# Patient Record
Sex: FEMALE | Race: BLACK OR AFRICAN AMERICAN | Employment: PART TIME | URBAN - METROPOLITAN AREA
[De-identification: names, ages, dates, MRNs, and addresses within clinical notes are randomized per-mention and may not be internally consistent; named-entity substitution may affect disease eponyms.]

---

## 2017-03-20 ENCOUNTER — TELEPHONE (OUTPATIENT)
Dept: OBGYN CLINIC | Age: 42
End: 2017-03-20

## 2017-04-11 ENCOUNTER — HOSPITAL ENCOUNTER (OUTPATIENT)
Dept: PAIN MANAGEMENT | Age: 42
Discharge: HOME | End: 2017-04-11
Payer: MEDICARE

## 2017-04-11 VITALS
SYSTOLIC BLOOD PRESSURE: 108 MMHG | OXYGEN SATURATION: 100 % | HEIGHT: 66 IN | WEIGHT: 152 LBS | DIASTOLIC BLOOD PRESSURE: 81 MMHG | RESPIRATION RATE: 18 BRPM | TEMPERATURE: 98.4 F | HEART RATE: 57 BPM | BODY MASS INDEX: 24.43 KG/M2

## 2017-04-11 DIAGNOSIS — M54.12 CERVICAL NEURITIS: Chronic | ICD-10-CM

## 2017-04-11 PROCEDURE — G0463 HOSPITAL OUTPT CLINIC VISIT: HCPCS

## 2017-04-11 RX ORDER — GABAPENTIN 100 MG/1
100 CAPSULE ORAL 3 TIMES DAILY
COMMUNITY
End: 2018-06-21

## 2017-04-11 ASSESSMENT — PAIN SCALES - GENERAL: PAINLEVEL_OUTOF10: 7

## 2017-04-11 ASSESSMENT — PAIN DESCRIPTION - PROGRESSION: CLINICAL_PROGRESSION: GRADUALLY WORSENING

## 2017-04-11 ASSESSMENT — PAIN DESCRIPTION - ORIENTATION: ORIENTATION: RIGHT;LEFT;LOWER;MID

## 2017-04-11 ASSESSMENT — PAIN DESCRIPTION - FREQUENCY: FREQUENCY: CONTINUOUS

## 2017-04-11 ASSESSMENT — PAIN DESCRIPTION - PAIN TYPE: TYPE: CHRONIC PAIN

## 2017-04-19 ENCOUNTER — HOSPITAL ENCOUNTER (OUTPATIENT)
Dept: PAIN MANAGEMENT | Age: 42
Discharge: HOME | End: 2017-04-19
Payer: MEDICARE

## 2017-04-19 VITALS
BODY MASS INDEX: 24.43 KG/M2 | WEIGHT: 152 LBS | TEMPERATURE: 98.1 F | HEIGHT: 66 IN | OXYGEN SATURATION: 99 % | RESPIRATION RATE: 18 BRPM | HEART RATE: 54 BPM | DIASTOLIC BLOOD PRESSURE: 81 MMHG | SYSTOLIC BLOOD PRESSURE: 131 MMHG

## 2017-04-19 DIAGNOSIS — M54.12 BRACHIAL NEURITIS OR RADICULITIS NOS: ICD-10-CM

## 2017-04-19 PROCEDURE — 62321 NJX INTERLAMINAR CRV/THRC: CPT

## 2017-04-19 PROCEDURE — 6360000002 HC RX W HCPCS: Performed by: ANESTHESIOLOGY

## 2017-04-19 PROCEDURE — 2580000003 HC RX 258: Performed by: ANESTHESIOLOGY

## 2017-04-19 RX ORDER — LIDOCAINE HYDROCHLORIDE 10 MG/ML
5 INJECTION, SOLUTION EPIDURAL; INFILTRATION; INTRACAUDAL; PERINEURAL ONCE
Status: DISCONTINUED | OUTPATIENT
Start: 2017-04-19 | End: 2017-04-20 | Stop reason: HOSPADM

## 2017-04-19 RX ORDER — SODIUM CHLORIDE, SODIUM LACTATE, POTASSIUM CHLORIDE, CALCIUM CHLORIDE 600; 310; 30; 20 MG/100ML; MG/100ML; MG/100ML; MG/100ML
500 INJECTION, SOLUTION INTRAVENOUS CONTINUOUS
Status: DISCONTINUED | OUTPATIENT
Start: 2017-04-19 | End: 2017-04-20 | Stop reason: HOSPADM

## 2017-04-19 RX ORDER — MIDAZOLAM HYDROCHLORIDE 1 MG/ML
0.5 INJECTION INTRAMUSCULAR; INTRAVENOUS
Status: DISCONTINUED | OUTPATIENT
Start: 2017-04-19 | End: 2017-04-20 | Stop reason: HOSPADM

## 2017-04-19 RX ORDER — FENTANYL CITRATE 50 UG/ML
25 INJECTION, SOLUTION INTRAMUSCULAR; INTRAVENOUS
Status: DISCONTINUED | OUTPATIENT
Start: 2017-04-19 | End: 2017-04-20 | Stop reason: HOSPADM

## 2017-04-19 RX ADMIN — SODIUM CHLORIDE, POTASSIUM CHLORIDE, SODIUM LACTATE AND CALCIUM CHLORIDE 500 ML: 600; 310; 30; 20 INJECTION, SOLUTION INTRAVENOUS at 10:55

## 2017-04-19 RX ADMIN — MIDAZOLAM HYDROCHLORIDE 0.5 MG: 1 INJECTION, SOLUTION INTRAMUSCULAR; INTRAVENOUS at 11:15

## 2017-04-19 RX ADMIN — FENTANYL CITRATE 25 MCG: 50 INJECTION, SOLUTION INTRAMUSCULAR; INTRAVENOUS at 11:15

## 2017-04-19 ASSESSMENT — PAIN SCALES - GENERAL
PAINLEVEL_OUTOF10: 5
PAINLEVEL_OUTOF10: 5

## 2017-05-03 ENCOUNTER — HOSPITAL ENCOUNTER (OUTPATIENT)
Dept: PAIN MANAGEMENT | Age: 42
Discharge: HOME | End: 2017-05-03
Payer: MEDICARE

## 2017-05-03 VITALS
DIASTOLIC BLOOD PRESSURE: 70 MMHG | RESPIRATION RATE: 20 BRPM | BODY MASS INDEX: 24.43 KG/M2 | OXYGEN SATURATION: 99 % | WEIGHT: 152 LBS | TEMPERATURE: 96.8 F | HEIGHT: 66 IN | HEART RATE: 53 BPM | SYSTOLIC BLOOD PRESSURE: 118 MMHG

## 2017-05-03 DIAGNOSIS — M54.9 BACKACHE, UNSPECIFIED: ICD-10-CM

## 2017-05-03 PROCEDURE — 2580000003 HC RX 258: Performed by: ANESTHESIOLOGY

## 2017-05-03 PROCEDURE — 6360000004 HC RX CONTRAST MEDICATION

## 2017-05-03 PROCEDURE — 62321 NJX INTERLAMINAR CRV/THRC: CPT

## 2017-05-03 PROCEDURE — 6360000002 HC RX W HCPCS

## 2017-05-03 RX ORDER — FENTANYL CITRATE 50 UG/ML
50 INJECTION, SOLUTION INTRAMUSCULAR; INTRAVENOUS PRN
Status: DISCONTINUED | OUTPATIENT
Start: 2017-05-03 | End: 2017-05-04 | Stop reason: HOSPADM

## 2017-05-03 RX ORDER — MIDAZOLAM HYDROCHLORIDE 1 MG/ML
0.5 INJECTION INTRAMUSCULAR; INTRAVENOUS 4 TIMES DAILY PRN
Status: DISCONTINUED | OUTPATIENT
Start: 2017-05-03 | End: 2017-05-04 | Stop reason: HOSPADM

## 2017-05-03 RX ORDER — LIDOCAINE HYDROCHLORIDE 10 MG/ML
1 INJECTION, SOLUTION EPIDURAL; INFILTRATION; INTRACAUDAL; PERINEURAL ONCE
Status: DISCONTINUED | OUTPATIENT
Start: 2017-05-03 | End: 2017-05-04 | Stop reason: HOSPADM

## 2017-05-03 RX ORDER — SODIUM CHLORIDE, SODIUM LACTATE, POTASSIUM CHLORIDE, CALCIUM CHLORIDE 600; 310; 30; 20 MG/100ML; MG/100ML; MG/100ML; MG/100ML
500 INJECTION, SOLUTION INTRAVENOUS CONTINUOUS
Status: DISCONTINUED | OUTPATIENT
Start: 2017-05-03 | End: 2017-05-04 | Stop reason: HOSPADM

## 2017-05-03 RX ADMIN — SODIUM CHLORIDE, POTASSIUM CHLORIDE, SODIUM LACTATE AND CALCIUM CHLORIDE 500 ML: 600; 310; 30; 20 INJECTION, SOLUTION INTRAVENOUS at 09:28

## 2017-05-03 ASSESSMENT — PAIN - FUNCTIONAL ASSESSMENT
PAIN_FUNCTIONAL_ASSESSMENT: 0-10

## 2017-05-03 ASSESSMENT — PAIN DESCRIPTION - DESCRIPTORS: DESCRIPTORS: TIGHTNESS;ACHING;BURNING

## 2017-05-23 ENCOUNTER — TELEPHONE (OUTPATIENT)
Dept: OBGYN CLINIC | Age: 42
End: 2017-05-23

## 2017-06-20 ENCOUNTER — TELEPHONE (OUTPATIENT)
Dept: OBGYN CLINIC | Age: 42
End: 2017-06-20

## 2017-11-02 ENCOUNTER — OFFICE VISIT (OUTPATIENT)
Dept: OBGYN CLINIC | Age: 42
End: 2017-11-02
Payer: MEDICARE

## 2017-11-02 VITALS
DIASTOLIC BLOOD PRESSURE: 80 MMHG | SYSTOLIC BLOOD PRESSURE: 120 MMHG | HEIGHT: 66 IN | BODY MASS INDEX: 26.52 KG/M2 | WEIGHT: 165 LBS

## 2017-11-02 DIAGNOSIS — Z12.39 SCREENING FOR MALIGNANT NEOPLASM OF BREAST: ICD-10-CM

## 2017-11-02 DIAGNOSIS — N89.8 VAGINAL DISCHARGE: ICD-10-CM

## 2017-11-02 DIAGNOSIS — Z12.4 SCREENING FOR MALIGNANT NEOPLASM OF CERVIX: ICD-10-CM

## 2017-11-02 DIAGNOSIS — Z01.419 ENCOUNTER FOR ANNUAL ROUTINE GYNECOLOGICAL EXAMINATION: Primary | ICD-10-CM

## 2017-11-02 PROCEDURE — 99396 PREV VISIT EST AGE 40-64: CPT | Performed by: OBSTETRICS & GYNECOLOGY

## 2017-11-02 RX ORDER — SODIUM CHLORIDE 0.65 %
AEROSOL, SPRAY (ML) NASAL
Refills: 0 | COMMUNITY
Start: 2017-10-08 | End: 2018-06-21

## 2017-11-02 RX ORDER — BUPROPION HYDROCHLORIDE 150 MG/1
150 TABLET, EXTENDED RELEASE ORAL 2 TIMES DAILY
Refills: 0 | COMMUNITY
Start: 2017-10-08 | End: 2019-06-11 | Stop reason: ALTCHOICE

## 2017-11-02 ASSESSMENT — ENCOUNTER SYMPTOMS
VOMITING: 0
CONSTIPATION: 0
HEARTBURN: 0
DOUBLE VISION: 0
COUGH: 0
ABDOMINAL PAIN: 0
BLURRED VISION: 0
NAUSEA: 0
DIARRHEA: 0
ORTHOPNEA: 0
WHEEZING: 0

## 2017-11-02 NOTE — PROGRESS NOTES
14    LEEP  2016    diag lap    NERVE BLOCK  2017    cervical steroid injection; celestone 9mg    NERVE BLOCK  2017    kelley# 2 decadron 10mg    TUBAL LIGATION       Family History   Problem Relation Age of Onset    Thyroid Disease Mother     Heart Disease Maternal Grandmother     Heart Disease Paternal Grandmother     Cancer Paternal Cousin      sinus cancer    Cancer Other      parental great grand aunt     History   Smoking Status    Current Every Day Smoker    Packs/day: 0.50    Years: 9.00    Types: Cigarettes    Start date: 1998   Smokeless Tobacco    Never Used     History   Alcohol Use No     Comment: rarely     Current Outpatient Prescriptions   Medication Sig Dispense Refill    RA NICOTINE 2 MG gum   0    buPROPion (WELLBUTRIN SR) 150 MG extended release tablet   0    gabapentin (NEURONTIN) 100 MG capsule Take 100 mg by mouth 3 times daily Take two capsules TID      ibuprofen (ADVIL;MOTRIN) 800 MG tablet Take 1 tablet by mouth every 6 hours as needed for Pain 120 tablet 0    tamsulosin (FLOMAX) 0.4 MG capsule Take 1 capsule by mouth daily 30 capsule 5    ondansetron (ZOFRAN) 4 MG tablet Take 1 tablet by mouth 4 times daily as needed for Nausea or Vomiting 20 tablet 0     No current facility-administered medications for this visit. Allergies:  Norco [hydrocodone-acetaminophen] and Penicillins    Gynecologic History:  Patient's last menstrual period was 10/19/2017. Sexually Active: Yes  STD History: Yes HPV      Obstetric History       T4      L4     SAB0   TAB0   Ectopic0   Molar0   Multiple0   Live Births4        Review of Systems   Constitutional: Negative for chills, fever and weight loss. HENT: Negative for hearing loss. Eyes: Negative for blurred vision and double vision. Respiratory: Negative for cough and wheezing. Cardiovascular: Negative for chest pain, palpitations and orthopnea.    Gastrointestinal: Negative for foreign body in the vagina. Genitourinary Comments: Grade II anterior prolapse with urethral hypermobility   Musculoskeletal: Normal range of motion. Lymphadenopathy:     She has no cervical adenopathy. Neurological: She is alert and oriented to person, place, and time. She has normal reflexes. Skin: Skin is warm and dry. Psychiatric: She has a normal mood and affect. Her behavior is normal. Judgment and thought content normal.               ASSESSMENT:        40 y.o. Female; Annual  1. Encounter for annual routine gynecological examination     2. Screening for malignant neoplasm of cervix  PAP SMEAR   3. Screening for malignant neoplasm of breast       Return in about 1 year (around 11/2/2018) for annual exam.              Hereditary Breast, Ovarian, Colon and Uterine Cancer screening Done. Tobacco & Secondary smoke risks reviewed; instructed on cessation and avoidance    PLAN:  - Pap collected per ASCCP guidelines.  -Discussed menopausal symptoms, HRT, incontinence. We did discuss OCPs to help control her bleeding versus hysterectomy given her previous ablation. Her symptoms are not severe enough right now to desire therapy. - Screening mammogram discussed and advised yearly if normal starting at age 37. Normal in 2016, order given for this year. - Calcium and Vitamin D dosing reviewed. Discussed smoking cessation.  - Colonoscopy screening reviewed. - General diet and exercise reviewed. - Routine health maintenance per patients PCP.     Electronically signed by Deidre Lyon MD on 11/2/17 at 9:38 AM  [facility]

## 2017-11-03 DIAGNOSIS — N89.8 VAGINAL DISCHARGE: ICD-10-CM

## 2017-11-03 LAB — PAP SMEAR: NORMAL

## 2017-11-03 RX ORDER — METRONIDAZOLE 500 MG/1
500 TABLET ORAL 2 TIMES DAILY
Qty: 14 TABLET | Refills: 0 | Status: SHIPPED | OUTPATIENT
Start: 2017-11-03 | End: 2017-11-10

## 2017-11-06 DIAGNOSIS — N89.8 VAGINAL DISCHARGE: ICD-10-CM

## 2017-11-27 ENCOUNTER — TELEPHONE (OUTPATIENT)
Dept: OBGYN CLINIC | Age: 42
End: 2017-11-27

## 2017-11-27 RX ORDER — FLUCONAZOLE 150 MG/1
150 TABLET ORAL ONCE
Qty: 1 TABLET | Refills: 0 | Status: SHIPPED | OUTPATIENT
Start: 2017-11-27 | End: 2017-11-27

## 2017-11-27 RX ORDER — METRONIDAZOLE 500 MG/1
500 TABLET ORAL 2 TIMES DAILY
Qty: 14 TABLET | Refills: 0 | Status: SHIPPED | OUTPATIENT
Start: 2017-11-27 | End: 2017-12-04

## 2017-11-27 NOTE — TELEPHONE ENCOUNTER
Called patient to discuss her message.  Will repeat course of Flagyl, advised patient if this doesn't help she will need to repeat exam.

## 2018-06-21 ENCOUNTER — APPOINTMENT (OUTPATIENT)
Dept: GENERAL RADIOLOGY | Age: 43
End: 2018-06-21
Payer: MEDICARE

## 2018-06-21 ENCOUNTER — HOSPITAL ENCOUNTER (EMERGENCY)
Age: 43
Discharge: HOME | End: 2018-06-21
Attending: EMERGENCY MEDICINE
Payer: MEDICARE

## 2018-06-21 VITALS
WEIGHT: 133.56 LBS | OXYGEN SATURATION: 99 % | RESPIRATION RATE: 16 BRPM | HEIGHT: 66 IN | TEMPERATURE: 98.1 F | BODY MASS INDEX: 21.46 KG/M2 | SYSTOLIC BLOOD PRESSURE: 121 MMHG | DIASTOLIC BLOOD PRESSURE: 74 MMHG | HEART RATE: 63 BPM

## 2018-06-21 DIAGNOSIS — S50.01XA CONTUSION OF RIGHT ELBOW, INITIAL ENCOUNTER: Primary | ICD-10-CM

## 2018-06-21 PROCEDURE — 99283 EMERGENCY DEPT VISIT LOW MDM: CPT

## 2018-06-21 PROCEDURE — 73080 X-RAY EXAM OF ELBOW: CPT

## 2018-06-21 RX ORDER — TIZANIDINE 4 MG/1
4 TABLET ORAL 2 TIMES DAILY PRN
COMMUNITY
End: 2019-06-11 | Stop reason: ALTCHOICE

## 2018-06-21 RX ORDER — PREGABALIN 75 MG/1
75 CAPSULE ORAL 2 TIMES DAILY
COMMUNITY
End: 2019-06-11 | Stop reason: ALTCHOICE

## 2018-06-21 RX ORDER — ETODOLAC 400 MG/1
400 TABLET, FILM COATED ORAL 2 TIMES DAILY
Qty: 20 TABLET | Refills: 0 | Status: SHIPPED | OUTPATIENT
Start: 2018-06-21

## 2018-06-21 RX ORDER — VARENICLINE TARTRATE 1 MG/1
1 TABLET, FILM COATED ORAL 2 TIMES DAILY
COMMUNITY
End: 2019-06-11 | Stop reason: ALTCHOICE

## 2018-06-21 ASSESSMENT — ENCOUNTER SYMPTOMS
EYE DISCHARGE: 0
ABDOMINAL PAIN: 0
SHORTNESS OF BREATH: 0
COUGH: 0
DIARRHEA: 0
EYE REDNESS: 0
COLOR CHANGE: 0
CONSTIPATION: 0
FACIAL SWELLING: 0
VOMITING: 0

## 2018-06-21 ASSESSMENT — PAIN SCALES - GENERAL: PAINLEVEL_OUTOF10: 8

## 2018-06-21 ASSESSMENT — PAIN DESCRIPTION - ORIENTATION: ORIENTATION: RIGHT

## 2018-06-21 ASSESSMENT — PAIN DESCRIPTION - PAIN TYPE: TYPE: ACUTE PAIN

## 2018-06-21 ASSESSMENT — PAIN DESCRIPTION - LOCATION: LOCATION: ELBOW

## 2019-06-11 ENCOUNTER — APPOINTMENT (OUTPATIENT)
Dept: GENERAL RADIOLOGY | Age: 44
End: 2019-06-11
Payer: MEDICARE

## 2019-06-11 ENCOUNTER — HOSPITAL ENCOUNTER (EMERGENCY)
Age: 44
Discharge: HOME | End: 2019-06-11
Attending: EMERGENCY MEDICINE
Payer: MEDICARE

## 2019-06-11 VITALS
SYSTOLIC BLOOD PRESSURE: 132 MMHG | DIASTOLIC BLOOD PRESSURE: 72 MMHG | RESPIRATION RATE: 18 BRPM | WEIGHT: 170 LBS | TEMPERATURE: 98 F | BODY MASS INDEX: 25.76 KG/M2 | HEIGHT: 68 IN | OXYGEN SATURATION: 99 % | HEART RATE: 64 BPM

## 2019-06-11 DIAGNOSIS — M43.6 TORTICOLLIS: Primary | ICD-10-CM

## 2019-06-11 PROCEDURE — 6360000002 HC RX W HCPCS: Performed by: NURSE PRACTITIONER

## 2019-06-11 PROCEDURE — 72040 X-RAY EXAM NECK SPINE 2-3 VW: CPT

## 2019-06-11 PROCEDURE — 99283 EMERGENCY DEPT VISIT LOW MDM: CPT

## 2019-06-11 PROCEDURE — 96372 THER/PROPH/DIAG INJ SC/IM: CPT

## 2019-06-11 RX ORDER — PREDNISONE 10 MG/1
TABLET ORAL
Qty: 20 TABLET | Refills: 0 | Status: SHIPPED | OUTPATIENT
Start: 2019-06-11

## 2019-06-11 RX ORDER — DEXAMETHASONE SODIUM PHOSPHATE 10 MG/ML
10 INJECTION INTRAMUSCULAR; INTRAVENOUS ONCE
Status: COMPLETED | OUTPATIENT
Start: 2019-06-11 | End: 2019-06-11

## 2019-06-11 RX ORDER — METHOCARBAMOL 750 MG/1
750 TABLET, FILM COATED ORAL 3 TIMES DAILY
Qty: 30 TABLET | Refills: 0 | Status: SHIPPED | OUTPATIENT
Start: 2019-06-11

## 2019-06-11 RX ADMIN — DEXAMETHASONE SODIUM PHOSPHATE 10 MG: 10 INJECTION INTRAMUSCULAR; INTRAVENOUS at 16:05

## 2019-06-11 ASSESSMENT — PAIN DESCRIPTION - DESCRIPTORS: DESCRIPTORS: ACHING;SHARP;SHOOTING;STABBING

## 2019-06-11 ASSESSMENT — ENCOUNTER SYMPTOMS
COLOR CHANGE: 0
BACK PAIN: 0

## 2019-06-11 ASSESSMENT — PAIN DESCRIPTION - LOCATION: LOCATION: ARM;ELBOW;SHOULDER

## 2019-06-11 ASSESSMENT — PAIN DESCRIPTION - PAIN TYPE: TYPE: ACUTE PAIN;CHRONIC PAIN

## 2019-06-11 ASSESSMENT — PAIN SCALES - GENERAL: PAINLEVEL_OUTOF10: 10

## 2019-06-11 ASSESSMENT — PAIN DESCRIPTION - ORIENTATION: ORIENTATION: RIGHT

## 2019-06-11 NOTE — ED PROVIDER NOTES
Team [address] ED  eMERGENCY dEPARTMENT eNCOUnter      Pt Name: Lesa Maharaj  MRN: [id number]  [place] 1975  Date of evaluation: 6/11/2019  Provider: WES Hill CNP    CHIEF COMPLAINT       Chief Complaint   Patient presents with    Arm Pain     rt elbow/arm and shoulder pain (denies injury)         HISTORY OF PRESENT ILLNESS  (Location/Symptom, Timing/Onset, Context/Setting, Quality, Duration, Modifying Factors, Severity.)   Lesa Maharaj is a 38 y.o. female who presents to the emergency department via private auto for right neck pain that radiates down her arm to her right arm. Onset was this morning. Denies injury, fever, chills. She has had this pain in the past. Rates her pain 10/10. Denies chance of pregnancy. Patient smells of marijuana. Nursing Notes were reviewed.     ALLERGIES     Norco [hydrocodone-acetaminophen] and Penicillins    CURRENT MEDICATIONS       Discharge Medication List as of 6/11/2019  4:02 PM      CONTINUE these medications which have NOT CHANGED    Details   etodolac (LODINE) 400 MG tablet Take 1 tablet by mouth 2 times daily, Disp-20 tablet, R-0Print      ibuprofen (ADVIL;MOTRIN) 800 MG tablet Take 1 tablet by mouth every 6 hours as needed for Pain, Disp-120 tablet, R-0      ondansetron (ZOFRAN) 4 MG tablet Take 1 tablet by mouth 4 times daily as needed for Nausea or Vomiting, Disp-20 tablet, R-0             PAST MEDICAL HISTORY         Diagnosis Date    Abnormal Pap smear     HGSIL, HPV    Arthritis     Asthma 1988    no Meds    Bipolar 1 disorder (HCC)     Bradycardia     Depression     E-coli UTI 6/13/07    Ganglion cyst of wrist 2015    Headache(784.0)     migraines       SURGICAL HISTORY           Procedure Laterality Date    BARTHOLIN GLAND CYST EXCISION  10/02    CERVIX SURGERY  8/02    cone     COLPOSCOPY  03/07/16    Cx Bx DEVIKA-1    DILATION AND CURETTAGE OF UTERUS  08/6/14    hysteroscopy, novasure    ENDOMETRIAL ABLATION 14    LEEP  2016    diag lap    NERVE BLOCK  2017    cervical steroid injection; celestone 9mg    NERVE BLOCK  2017    kelley# 2 decadron 10mg    TUBAL LIGATION           FAMILY HISTORY           Problem Relation Age of Onset    Thyroid Disease Mother     Heart Disease Maternal Grandmother     Heart Disease Paternal Grandmother     Cancer Paternal Cousin         sinus cancer    Cancer Other         parental great grand aunt     Family Status   Relation Name Status    Mother  Alive    MGM  Alive    [address]     Lonn Hima      Other      Father  Alive    MGF      PGF          SOCIAL HISTORY      reports that she has been smoking cigarettes. She started smoking about 21 years ago. She has a 4.50 pack-year smoking history. She has never used smokeless tobacco. She reports that she does not drink alcohol or use drugs. REVIEW OF SYSTEMS    (2-9 systems for level 4, 10 or more for level 5)      Review of Systems   Constitutional: Negative for chills, diaphoresis, fatigue and fever. Musculoskeletal: Positive for arthralgias, myalgias and neck pain. Negative for back pain and gait problem. Skin: Negative for color change, rash and wound. Neurological: Negative for dizziness, weakness, light-headedness, numbness and headaches. Except as noted above the remainder of the review of systems was reviewed and negative. PHYSICAL EXAM    (up to 7 for level 4, 8 or more for level 5)     ED Triage Vitals [19 1530]   BP Temp Temp Source Pulse Resp SpO2 Height Weight   132/72 98 °F (36.7 °C) Oral 64 18 99 % 5' 8\" (1.727 m) 170 lb (77.1 kg)     Physical Exam   Constitutional: She is oriented to person, place, and time. She appears well-developed and well-nourished. No distress. Eyes: Conjunctivae are normal.   Cardiovascular: Intact distal pulses. Pulmonary/Chest: Effort normal. No respiratory distress.    Musculoskeletal:        Cervical back: She exhibits tenderness (right supraspinal), pain and spasm. She exhibits no bony tenderness, no swelling and no deformity. Radial pulse palpable. Distal sensation intact. Neurological: She is alert and oriented to person, place, and time. She has normal strength. Coordination and gait normal.   Skin: Skin is warm and dry. Capillary refill takes less than 2 seconds. No rash noted. She is not diaphoretic. Psychiatric: She has a normal mood and affect. Her behavior is normal.   Vitals reviewed. DIAGNOSTIC RESULTS     RADIOLOGY:   Non-plain film images such as CT, Ultrasound and MRI are read by the radiologist. Plain radiographic images are visualized and preliminarily interpreted by the emergency physician with the below findings:    Interpretation per the Radiologist below, if available at the time of this note:    Xr Cervical Spine (2-3 Views)    Result Date: 6/11/2019  EXAMINATION: 3 XRAY VIEWS OF THE CERVICAL SPINE 6/11/2019 3:40 pm COMPARISON: None. HISTORY: ORDERING SYSTEM PROVIDED HISTORY: pain TECHNOLOGIST PROVIDED HISTORY: pain Ordering Physician Provided Reason for Exam: Pt states right side neck pain for years, tingling and numbness, worse today Acuity: Acute Type of Exam: Initial FINDINGS: Cervical lordosis is somewhat exaggerated. The vertebral body heights are normal.  The disc spaces are normal.  The facets are intact. The spinous processes are intact. Prevertebral soft tissues are normal.     No acute abnormality. .  Minimal degenerative changes.      EMERGENCY DEPARTMENT COURSE and DIFFERENTIAL DIAGNOSIS/MDM:   Vitals:    Vitals:    06/11/19 1530   BP: 132/72   Pulse: 64   Resp: 18   Temp: 98 °F (36.7 °C)   TempSrc: Oral   SpO2: 99%   Weight: 170 lb (77.1 kg)   Height: 5' 8\" (1.727 m)         MEDICATIONS GIVEN IN THE ED:  Medications   dexamethasone (DECADRON) injection 10 mg (10 mg Intramuscular Given 6/11/19 1605)       CLINICAL DECISION MAKING:  The patient presented alert with a nontoxic appearance and was seen in conjunction with Dr. Jennifer Jolly. Imaging was negative for acute findings. Prescriptions were written for prednisone and robaxin. The patient was advised to not drink alcohol, drive, or operate heavy machinery while taking the robaxin. Follow up with pcp, return to ED if condition worsens. FINAL IMPRESSION      1.  Torticollis            Problem List  Patient Active Problem List   Diagnosis Code    Dysmenorrhea N94.6    Light headedness R42    Sinus node dysfunction ([facility] [address] I49.5    Hypokalemia E87.6    Marijuana smoker F12.90    Cervical neuritis M54.12         DISPOSITION/PLAN   DISPOSITION Decision To Discharge 06/11/2019 04:01:36 PM      PATIENT REFERRED TO:   Ana M Lundy MD  [address] [place] [address]. [address] [place] [address]  [phone]    Schedule an appointment as soon as possible for a visit       [facility] ED  [address] [facility]  [phone]    If symptoms worsen, As needed      DISCHARGE MEDICATIONS:     Discharge Medication List as of 6/11/2019  4:02 PM      START taking these medications    Details   predniSONE (DELTASONE) 10 MG tablet Take three tablets on days 1-3, two tablets on days 4-7, one tablet on days 8-10., Disp-20 tablet, R-0Print      methocarbamol (ROBAXIN-750) 750 MG tablet Take 1 tablet by mouth 3 times daily   WARNING:  May cause drowsiness.  May impair ability to operate vehicles or machinery.  Do not use in combination with alcohol., Disp-30 tablet, R-0Print                 (Please note that portions of this note were completed with a voice recognition program.  Efforts were made to edit the dictations but occasionally words are mis-transcribed.)    Osbaldo Ta, APRN - CNP      Osbaldo Ta, APRN - CARMINE  06/11/19 1731

## 2019-06-11 NOTE — LETTER
[facility] ED  [facility] [address]  Phone: [phone]               June 11, 2019    Patient: Juliana Connelly   YOB: 1975   Date of Visit: 6/11/2019       To Whom It May Concern: Giovany Mcneal was seen and treated in our emergency department on 6/11/2019. She may return to work on 6/12/2019.       Sincerely,       Jaxon Ferro RN         Signature:__________________________________

## 2019-06-11 NOTE — ED PROVIDER NOTES
I was present in the ED during this patient's evaluation and management by the Advance Practice Provider and was available to address any concerns about their medical management.     Rashida Pozo MD  Attending, Emergency Department      Barbara Monge MD  06/11/19 0208

## 2019-12-16 ENCOUNTER — TELEPHONE (OUTPATIENT)
Dept: OBGYN CLINIC | Age: 44
End: 2019-12-16

## 2019-12-16 DIAGNOSIS — N94.6 DYSMENORRHEA: Primary | ICD-10-CM

## 2019-12-17 RX ORDER — IBUPROFEN 800 MG/1
800 TABLET ORAL EVERY 8 HOURS PRN
Qty: 90 TABLET | Refills: 0 | Status: SHIPPED | OUTPATIENT
Start: 2019-12-17

## 2020-07-13 ENCOUNTER — TELEPHONE (OUTPATIENT)
Dept: OBGYN CLINIC | Age: 45
End: 2020-07-13

## 2020-07-13 ENCOUNTER — OFFICE VISIT (OUTPATIENT)
Dept: OBGYN CLINIC | Age: 45
End: 2020-07-13
Payer: MEDICARE

## 2020-07-13 VITALS
BODY MASS INDEX: 24.35 KG/M2 | DIASTOLIC BLOOD PRESSURE: 74 MMHG | SYSTOLIC BLOOD PRESSURE: 118 MMHG | WEIGHT: 151.5 LBS | HEIGHT: 66 IN

## 2020-07-13 PROCEDURE — G8420 CALC BMI NORM PARAMETERS: HCPCS | Performed by: OBSTETRICS & GYNECOLOGY

## 2020-07-13 PROCEDURE — G8427 DOCREV CUR MEDS BY ELIG CLIN: HCPCS | Performed by: OBSTETRICS & GYNECOLOGY

## 2020-07-13 PROCEDURE — 99213 OFFICE O/P EST LOW 20 MIN: CPT | Performed by: OBSTETRICS & GYNECOLOGY

## 2020-07-13 PROCEDURE — 4004F PT TOBACCO SCREEN RCVD TLK: CPT | Performed by: OBSTETRICS & GYNECOLOGY

## 2020-07-13 NOTE — LETTER
[facility]  Phone: [phone]  Fax: [phone]    Edwin Christopher MD        July 13, 2020     Patient: Yasmany Luther   YOB: 1975   Date of Visit: 7/13/2020       To Whom It May Concern: It is my medical opinion that Amarilys Dunaway may remain out of work on 7/13 and 7/14. If you have any questions or concerns, please don't hesitate to call.     Sincerely,        Edwin Christopher MD

## 2020-07-13 NOTE — PROGRESS NOTES
[address] [facility], [address] [place] [address]  DATE OF VISIT:  20    Leanne Escobar    :  1975  CHIEF COMPLAINT:    Chief Complaint   Patient presents with    Pelvic Pain     pt has been bleeding for two and a half weeks with painful cramps         HPI :   Leanne Escobar is a 41 y.o. female here for ***    Past Medical History:   Diagnosis Date    Abnormal Pap smear     HGSIL, HPV    Arthritis     Asthma     no Meds    Bipolar 1 disorder ([address]     Bradycardia     Depression     E-coli UTI 07    Ganglion cyst of wrist     Headache(784.0)     migraines      Past Surgical History:   Procedure Laterality Date    BARTHOLIN GLAND CYST EXCISION  10/02    CERVIX SURGERY      cone     COLPOSCOPY  16    Cx Bx DEVIKA-1    DILATION AND CURETTAGE OF UTERUS  14    hysteroscopy, novasure    ENDOMETRIAL ABLATION  14    LEEP  2016    diag lap    NERVE BLOCK  2017    cervical steroid injection; celestone 9mg    NERVE BLOCK  2017    kelley# 2 decadron 10mg    TUBAL LIGATION       Family History   Problem Relation Age of Onset    Thyroid Disease Mother     Heart Disease Maternal Grandmother     Heart Disease Paternal Grandmother     Cancer Paternal Cousin         sinus cancer    Cancer Other         parental great grand aunt     Social History     Tobacco Use   Smoking Status Current Every Day Smoker    Packs/day: 0.50    Years: 9.00    Pack years: 4.50    Types: Cigarettes    Start date: 1998   Smokeless Tobacco Never Used     Social History     Substance and Sexual Activity   Alcohol Use No    Alcohol/week: 0.0 standard drinks    Comment: rarely     Current Outpatient Medications   Medication Sig Dispense Refill    ibuprofen (ADVIL;MOTRIN) 800 MG tablet Take 1 tablet by mouth every 8 hours as needed for Pain 90 tablet 0    predniSONE (DELTASONE) 10 MG tablet Take three tablets on days 1-3, two tablets on days 4-7, one tablet on days 8-10. 20 tablet 0    methocarbamol (ROBAXIN-750) 750 MG tablet Take 1 tablet by mouth 3 times daily   WARNING:  May cause drowsiness.  May impair ability to operate vehicles or machinery.  Do not use in combination with alcohol. 30 tablet 0    etodolac (LODINE) 400 MG tablet Take 1 tablet by mouth 2 times daily 20 tablet 0    ibuprofen (ADVIL;MOTRIN) 800 MG tablet Take 1 tablet by mouth every 6 hours as needed for Pain 120 tablet 0    ondansetron (ZOFRAN) 4 MG tablet Take 1 tablet by mouth 4 times daily as needed for Nausea or Vomiting 20 tablet 0     No current facility-administered medications for this visit. Allergies:  Norco [hydrocodone-acetaminophen] and Penicillins    Gynecologic History:  Patient's last menstrual period was 2020. Sexually Active: {YES / E}  STD History: {YES/NO:[id number]::\"No\"}      OB History    Para Term  AB Living   6 4 4 0 2 4   SAB TAB Ectopic Molar Multiple Live Births   2 0 0 0 0 4       Review of Systems    /74 (Site: Right Upper Arm, Position: Sitting, Cuff Size: Medium Adult)   Ht 5' 6\" (1.676 m)   Wt 151 lb 8 oz (68.7 kg)   LMP 2020   BMI 24.45 kg/m²     Physical Exam    ASSESSMENT:  Isabell Kelley is a 41 y.o. female      ICD-10-CM    1. Dysmenorrhea  N94.6    2.  Abnormal uterine bleeding (AUB)  N93.9 CBC Auto Differential     TSH with Reflex       PLAN:    ***    Electronically signed by Dinah Kawasaki, MD on 2020 at 2:57 PM

## 2020-07-13 NOTE — PROGRESS NOTES
[address] [facility], [address] [place] [address]  DATE OF VISIT:  20    Juanita Mcghee    :  1975  CHIEF COMPLAINT:    Chief Complaint   Patient presents with    Pelvic Pain     pt has been bleeding for two and a half weeks with painful cramps         HPI :   Juanita Mcghee is a 41 y.o. female here to discuss vaginal bleeding. Had a good initial result from her ablation in , but has been bleeding continuously for the past 2 and a half weeks. Not heavy and only minor cramping. Does feel a bit lightheaded. Not ready to consider hysterectomy.      Past Medical History:   Diagnosis Date    Abnormal Pap smear     HGSIL, HPV    Arthritis     Asthma     no Meds    Bipolar 1 disorder (HCC)     Bradycardia     Depression     E-coli UTI 07    Ganglion cyst of wrist     Headache(784.0)     migraines      Past Surgical History:   Procedure Laterality Date    BARTHOLIN GLAND CYST EXCISION  10/02    CERVIX SURGERY      cone     COLPOSCOPY  16    Cx Bx DEVIKA-1    DILATION AND CURETTAGE OF UTERUS  14    hysteroscopy, novasure    ENDOMETRIAL ABLATION  14    LEEP  2016    diag lap    NERVE BLOCK  2017    cervical steroid injection; celestone 9mg    NERVE BLOCK  2017    kelley# 2 decadron 10mg    TUBAL LIGATION       Family History   Problem Relation Age of Onset    Thyroid Disease Mother     Heart Disease Maternal Grandmother     Heart Disease Paternal Grandmother     Cancer Paternal Cousin         sinus cancer    Cancer Other         parental great grand aunt     Social History     Tobacco Use   Smoking Status Current Every Day Smoker    Packs/day: 0.50    Years: 9.00    Pack years: 4.50    Types: Cigarettes    Start date: 1998   Smokeless Tobacco Never Used     Social History     Substance and Sexual Activity   Alcohol Use No    Alcohol/week: 0.0 standard drinks    Comment: rarely     Current Outpatient Medications Medication Sig Dispense Refill    norethindrone (AYGESTIN) 5 MG tablet Take 1 tablet by mouth daily 30 tablet 3    ibuprofen (ADVIL;MOTRIN) 800 MG tablet Take 1 tablet by mouth every 8 hours as needed for Pain 90 tablet 0    predniSONE (DELTASONE) 10 MG tablet Take three tablets on days 1-3, two tablets on days 4-7, one tablet on days 8-10. 20 tablet 0    methocarbamol (ROBAXIN-750) 750 MG tablet Take 1 tablet by mouth 3 times daily   WARNING:  May cause drowsiness.  May impair ability to operate vehicles or machinery.  Do not use in combination with alcohol. 30 tablet 0    etodolac (LODINE) 400 MG tablet Take 1 tablet by mouth 2 times daily 20 tablet 0    ibuprofen (ADVIL;MOTRIN) 800 MG tablet Take 1 tablet by mouth every 6 hours as needed for Pain 120 tablet 0    ondansetron (ZOFRAN) 4 MG tablet Take 1 tablet by mouth 4 times daily as needed for Nausea or Vomiting 20 tablet 0     No current facility-administered medications for this visit. Allergies:  Norco [hydrocodone-acetaminophen] and Penicillins    Gynecologic History:  Patient's last menstrual period was 2020. Sexually Active: Yes  STD History: No      OB History    Para Term  AB Living   6 4 4 0 2 4   SAB TAB Ectopic Molar Multiple Live Births   2 0 0 0 0 4       Review of Systems   Constitutional: Negative for chills and fever. HENT: Negative for hearing loss. Respiratory: Negative for cough and wheezing. Cardiovascular: Negative for chest pain and palpitations. Gastrointestinal: Negative for abdominal pain, constipation, diarrhea, nausea and vomiting. Genitourinary: Negative for dysuria, frequency and urgency. Musculoskeletal: Negative for myalgias. Skin: Negative for rash. Neurological: Negative for dizziness, weakness and headaches. Hematological: Does not bruise/bleed easily. Psychiatric/Behavioral: Negative for suicidal ideas.        /74 (Site: Right Upper Arm, Position: Sitting, Cuff Size: Medium Adult)   Ht 5' 6\" (1.676 m)   Wt 151 lb 8 oz (68.7 kg)   LMP 06/26/2020   BMI 24.45 kg/m²     Physical Exam  Constitutional:       Appearance: She is well-developed. HENT:      Head: Normocephalic. Neck:      Musculoskeletal: Normal range of motion. Thyroid: No thyromegaly. Cardiovascular:      Rate and Rhythm: Normal rate and regular rhythm. Pulmonary:      Effort: Pulmonary effort is normal. No respiratory distress. Abdominal:      General: There is no distension. Palpations: Abdomen is soft. Tenderness: There is no abdominal tenderness. Musculoskeletal: Normal range of motion. Skin:     General: Skin is warm and dry. Neurological:      Mental Status: She is alert and oriented to person, place, and time. Psychiatric:         Behavior: Behavior normal.         Thought Content: Thought content normal.         Judgment: Judgment normal.         ASSESSMENT:  Oscar Zimmerman is a 41 y.o. female      ICD-10-CM    1. Dysmenorrhea  N94.6    2. Abnormal uterine bleeding (AUB)  N93.9 CBC Auto Differential     TSH with Reflex     US Non OB Transvaginal     US Pelvis Complete       PLAN:    Will place on Aygestin temporarily to help with bleeding and check US. Discussed options of medical management or hysterectomy. CBC and TSH ordered.      Electronically signed by Nando Metcalf MD on 7/14/2020 at 2:02 PM

## 2020-07-13 NOTE — TELEPHONE ENCOUNTER
Patient has appointment today at 245pm but has work at [employer]   Last month she had no cycle and this month she has been bleeding for 2.5wks going through 2 pads and 2 tampons an hour. She is tired and cramping very bad. She wants to know what the appointment will entail. Will she need to miss work today/tomorrow. Work Note?

## 2020-07-13 NOTE — TELEPHONE ENCOUNTER
We can give her a note for her appointment today. I can't say without seeing her if she needs to be off work tomorrow or not.  Her appointment will involve discussion of her symptoms and a physical exam.

## 2020-07-14 ENCOUNTER — PROCEDURE VISIT (OUTPATIENT)
Dept: OBGYN CLINIC | Age: 45
End: 2020-07-14
Payer: MEDICARE

## 2020-07-14 PROCEDURE — 76830 TRANSVAGINAL US NON-OB: CPT | Performed by: OBSTETRICS & GYNECOLOGY

## 2020-07-14 ASSESSMENT — ENCOUNTER SYMPTOMS
NAUSEA: 0
CONSTIPATION: 0
VOMITING: 0
ABDOMINAL PAIN: 0
DIARRHEA: 0
COUGH: 0
WHEEZING: 0

## 2021-08-25 PROBLEM — M99.79 NARROWING OF INTERVERTEBRAL DISC SPACE: Status: ACTIVE | Noted: 2021-08-25

## 2021-08-25 PROBLEM — G43.909 MIGRAINE: Status: ACTIVE | Noted: 2021-08-25

## 2021-08-25 PROBLEM — M25.579 FOOT JOINT PAIN: Status: ACTIVE | Noted: 2021-08-25

## 2021-08-25 PROBLEM — G56.20 CUBITAL TUNNEL SYNDROME: Status: ACTIVE | Noted: 2021-08-25

## 2021-08-25 PROBLEM — I95.9 HYPOTENSION: Status: ACTIVE | Noted: 2021-08-25

## 2021-08-25 PROBLEM — M54.2 NECK PAIN: Status: ACTIVE | Noted: 2021-08-25

## 2021-08-25 PROBLEM — F32.A DEPRESSIVE DISORDER: Status: ACTIVE | Noted: 2021-08-25

## 2021-08-25 PROBLEM — G56.00 CARPAL TUNNEL SYNDROME: Status: ACTIVE | Noted: 2021-08-25

## 2021-08-25 PROBLEM — J32.9 SINUSITIS: Status: ACTIVE | Noted: 2021-08-25

## 2021-08-25 PROBLEM — M54.17 LUMBOSACRAL RADICULOPATHY: Status: ACTIVE | Noted: 2021-08-25

## 2021-08-25 PROBLEM — M53.3 SACROILIAC JOINT PAIN: Status: ACTIVE | Noted: 2021-08-25

## 2021-08-25 PROBLEM — D48.7 NEOPLASM OF UNCERTAIN BEHAVIOR OF HAND: Status: ACTIVE | Noted: 2021-08-25

## 2021-08-25 PROBLEM — R07.89 CHEST WALL PAIN: Status: ACTIVE | Noted: 2020-01-07

## 2021-08-25 PROBLEM — R53.83 FATIGUE: Status: ACTIVE | Noted: 2021-08-25

## 2021-08-25 PROBLEM — G44.209 TENSION-TYPE HEADACHE: Status: ACTIVE | Noted: 2021-08-25

## 2021-08-25 PROBLEM — I44.1 SECOND DEGREE ATRIOVENTRICULAR BLOCK: Status: ACTIVE | Noted: 2019-12-03

## 2021-08-25 PROBLEM — R09.89 CAROTID BRUIT: Status: ACTIVE | Noted: 2021-08-25

## 2021-08-25 PROBLEM — G62.9 NEUROPATHY: Status: ACTIVE | Noted: 2021-08-25

## 2021-08-25 PROBLEM — G43.019 REFRACTORY MIGRAINE WITHOUT AURA: Status: ACTIVE | Noted: 2021-08-25

## 2021-09-24 PROBLEM — J32.9 SINUSITIS: Status: RESOLVED | Noted: 2021-08-25 | Resolved: 2021-09-24

## 2022-05-02 ASSESSMENT — ENCOUNTER SYMPTOMS
COUGH: 0
ABDOMINAL PAIN: 0
BACK PAIN: 0
CONSTIPATION: 0
DIARRHEA: 0
ABDOMINAL DISTENTION: 0
SHORTNESS OF BREATH: 0

## 2022-05-02 NOTE — PROGRESS NOTES
[address] [facility]  [address] [place] [address] [address] [facility] [address]  Dept: [phone]  DATE OF VISIT:  22        History and Physical    Adrianne Ceja    :  1975  CHIEF COMPLAINT:  No chief complaint on file. HPI :       Adrianne Ceja is a 56 y.o. female presents for her annual exam.     Works in [employer]. Has 4 children- / and 11 grandchildren. Hx ablation. Working on healthy and trying to increase activity. Menarche 16. She reports irregular cycles. Denies dysmenorrhea. Denies menorrhagia  Having bothersome hot flushes at night. Discussed behavioral/environmental changes that can be helpful during perimenopause. Will rto if they worsen. Denies vaginal dryness. Uses TL for contraception.    _____________________________________________________________________  Past Medical History:   Diagnosis Date    Abnormal Pap smear     HGSIL, HPV    Arthritis     Asthma     no Meds    Bipolar 1 disorder (HCC)     Bradycardia     Depression     E-coli UTI 07    Ganglion cyst of wrist     Headache(784.0)     migraines                                                                   Past Surgical History:   Procedure Laterality Date    BARTHOLIN GLAND CYST EXCISION  10/02    CERVIX SURGERY      cone     COLPOSCOPY  16    Cx Bx DEVIKA-1    DILATION AND CURETTAGE OF UTERUS  14    hysteroscopy, novasure    ENDOMETRIAL ABLATION  14    LEEP  2016    diag lap    NERVE BLOCK  2017    cervical steroid injection; celestone 9mg    NERVE BLOCK  2017    kelley# 2 decadron 10mg    TUBAL LIGATION       Family History   Problem Relation Age of Onset    Thyroid Disease Mother     Heart Disease Maternal Grandmother     Heart Disease Paternal Grandmother     Cancer Paternal Cousin         sinus cancer    Cancer Other         parental great grand aunt     Social History     Tobacco Use   Smoking Status Current Every Day Smoker    Packs/day: 0.50    Years: 9.00    Pack years: 4.50    Types: Cigarettes    Start date: 1998   Smokeless Tobacco Never Used     Social History     Substance and Sexual Activity   Alcohol Use No    Alcohol/week: 0.0 standard drinks    Comment: rarely     Current Outpatient Medications   Medication Sig Dispense Refill    norethindrone (AYGESTIN) 5 MG tablet Take 1 tablet by mouth daily 30 tablet 3    ibuprofen (ADVIL;MOTRIN) 800 MG tablet Take 1 tablet by mouth every 8 hours as needed for Pain 90 tablet 0    predniSONE (DELTASONE) 10 MG tablet Take three tablets on days 1-3, two tablets on days 4-7, one tablet on days 8-10. 20 tablet 0    methocarbamol (ROBAXIN-750) 750 MG tablet Take 1 tablet by mouth 3 times daily   WARNING:  May cause drowsiness.  May impair ability to operate vehicles or machinery.  Do not use in combination with alcohol. 30 tablet 0    etodolac (LODINE) 400 MG tablet Take 1 tablet by mouth 2 times daily 20 tablet 0    ibuprofen (ADVIL;MOTRIN) 800 MG tablet Take 1 tablet by mouth every 6 hours as needed for Pain 120 tablet 0    ondansetron (ZOFRAN) 4 MG tablet Take 1 tablet by mouth 4 times daily as needed for Nausea or Vomiting 20 tablet 0     No current facility-administered medications for this visit. Allergies:  Norco [hydrocodone-acetaminophen] and Penicillins    Gynecologic History:  No LMP recorded. Patient has had an ablation. Sexually Active:Yes  How many partners in the last 12 months- 1  Partners:   Discussed using condoms for STI protection   Dyspareunia: denies  STD History: No  Pap Smear History: 2017 NILM  Mammogram:  No results documented.  Order given  Colonoscopy: discuss w/PCP  Fam Hx Breast, Ovarian, Colorectal Ca: denies    OB History    Para Term  AB Living   6 4 4 0 2 4   SAB IAB Ectopic Molar Multiple Live Births   2 0 0 0 0 4       Review of Systems   Constitutional: Negative for appetite change and fatigue. HENT: Negative for congestion and hearing loss. Eyes: Negative for visual disturbance. Respiratory: Negative for cough and shortness of breath. Cardiovascular: Negative for chest pain and palpitations. Gastrointestinal: Negative for abdominal distention, abdominal pain, constipation and diarrhea. Endocrine: Positive for heat intolerance (night flashes). Genitourinary: Positive for genital sores (right vag wall). Negative for flank pain, frequency, menstrual problem, pelvic pain and vaginal discharge. Musculoskeletal: Negative for back pain. Neurological: Negative for syncope and headaches. Psychiatric/Behavioral: Negative for behavioral problems. There were no vitals taken for this visit. Physical Exam  Constitutional:       Appearance: She is well-developed. HENT:      Head: Normocephalic. Eyes:      Extraocular Movements: Extraocular movements intact. Conjunctiva/sclera: Conjunctivae normal.   Neck:      Thyroid: No thyromegaly. Trachea: No tracheal deviation. Pulmonary:      Effort: Pulmonary effort is normal. No respiratory distress. Chest:   Breasts: Breasts are symmetrical.      Right: No inverted nipple. Left: No inverted nipple, mass, nipple discharge, skin change or tenderness. Abdominal:      General: There is no distension. Palpations: Abdomen is soft. There is no mass. Tenderness: There is no abdominal tenderness. Genitourinary:     Labia:         Right: No rash or lesion. Left: No rash or lesion. Vagina: Lesions (right vag wall) present. No vaginal discharge or tenderness. Cervix: No cervical motion tenderness, discharge or friability. Uterus: Not deviated, not enlarged and not fixed. Adnexa:         Right: No mass, tenderness or fullness. Left: No mass, tenderness or fullness.          Musculoskeletal:         General: No tenderness. Normal range of motion. Cervical back: Normal range of motion. Skin:     General: Skin is warm and dry. Neurological:      General: No focal deficit present. Mental Status: She is alert and oriented to person, place, and time. Mental status is at baseline. Psychiatric:         Mood and Affect: Mood normal.         Behavior: Behavior normal.         Thought Content: Thought content normal.         Judgment: Judgment normal.                 ASSESSMENT:    56 y.o. Female; Annual   Diagnosis Orders   1. Encounter for gynecological examination  PAP SMEAR   2. Encounter for screening mammogram for breast cancer  RAFAELA JOSESITO DIGITAL SCREEN BILATERAL   3. Vaginal lump     4. Hot flashes       No follow-ups on file. Hereditary Breast, Ovarian, Colon and Uterine Cancer screening Done. Tobacco & Secondary smoke risks reviewed; instructed on cessation and avoidance    - Pap collected per ASCCP guidelines.  -Discussed menopausal symptoms, HRT, incontinence. - Screening mammogram discussed and advised yearly if normalstarting at age 36.  - Calcium and Vitamin D dosing reviewed. - Colonoscopy screening reviewed. - General diet and exercise reviewed. - Routine health maintenance per patients PCP.   MARY Carrasco  Electronically signed by WES Butts - CNP on 5/2/22 at 12:38 PM EDT    [address] [facility] - Missy Alcala

## 2022-05-04 ENCOUNTER — OFFICE VISIT (OUTPATIENT)
Dept: OBGYN CLINIC | Age: 47
End: 2022-05-04
Payer: MEDICARE

## 2022-05-04 ENCOUNTER — HOSPITAL ENCOUNTER (OUTPATIENT)
Age: 47
Setting detail: SPECIMEN
Discharge: HOME | End: 2022-05-04

## 2022-05-04 VITALS
WEIGHT: 162 LBS | BODY MASS INDEX: 26.03 KG/M2 | HEIGHT: 66 IN | SYSTOLIC BLOOD PRESSURE: 122 MMHG | DIASTOLIC BLOOD PRESSURE: 72 MMHG

## 2022-05-04 DIAGNOSIS — N94.9 VAGINAL LUMP: ICD-10-CM

## 2022-05-04 DIAGNOSIS — Z12.31 ENCOUNTER FOR SCREENING MAMMOGRAM FOR BREAST CANCER: ICD-10-CM

## 2022-05-04 DIAGNOSIS — Z01.419 ENCOUNTER FOR GYNECOLOGICAL EXAMINATION: Primary | ICD-10-CM

## 2022-05-04 DIAGNOSIS — R23.2 HOT FLASHES: ICD-10-CM

## 2022-05-04 PROBLEM — M79.641 PAIN IN RIGHT HAND: Status: ACTIVE | Noted: 2021-12-11

## 2022-05-04 PROBLEM — F17.200 TOBACCO DEPENDENCE SYNDROME: Status: ACTIVE | Noted: 2021-07-13

## 2022-05-04 PROBLEM — F41.1 GENERALIZED ANXIETY DISORDER: Status: ACTIVE | Noted: 2021-07-13

## 2022-05-04 PROBLEM — M77.8 OTHER ENTHESOPATHIES, NOT ELSEWHERE CLASSIFIED: Status: ACTIVE | Noted: 2021-12-11

## 2022-05-04 PROBLEM — G56.23 LESION OF ULNAR NERVE, BILATERAL UPPER LIMBS: Status: ACTIVE | Noted: 2021-12-16

## 2022-05-04 PROBLEM — S46.819A STRAIN OF TRAPEZIUS MUSCLE: Status: ACTIVE | Noted: 2021-07-13

## 2022-05-04 PROBLEM — M79.661 PAIN IN RIGHT LOWER LEG: Status: ACTIVE | Noted: 2022-02-12

## 2022-05-04 PROBLEM — S80.11XA CONTUSION OF RIGHT LOWER LEG: Status: ACTIVE | Noted: 2022-02-12

## 2022-05-04 PROBLEM — U07.1 COVID-19: Status: ACTIVE | Noted: 2021-12-21

## 2022-05-04 PROBLEM — S46.811D: Status: ACTIVE | Noted: 2021-08-02

## 2022-05-04 PROCEDURE — 99396 PREV VISIT EST AGE 40-64: CPT | Performed by: NURSE PRACTITIONER

## 2022-05-04 RX ORDER — FLUTICASONE PROPIONATE 50 MCG
SPRAY, SUSPENSION (ML) NASAL
COMMUNITY

## 2022-05-04 RX ORDER — BUPROPION HYDROCHLORIDE 150 MG/1
TABLET, EXTENDED RELEASE ORAL
COMMUNITY

## 2022-05-04 RX ORDER — LORATADINE 10 MG/1
TABLET ORAL
COMMUNITY

## 2022-05-04 NOTE — PATIENT INSTRUCTIONS
Patient Education        Hot Flashes During Menopause: Care Instructions  Overview     A hot flash is a sudden feeling of intense body heat. Your head, neck, and chest may get red. Your heartbeat may speed up, and you may feel anxious. You may find that hot flashes occur more often in warm rooms or during stressful times. Hot flashes and other symptoms are a normal response to the hormonechanges that occur before your menstrual cycle goes away completely (menopause). Hot flashes often get better and go away with time. Making lifestyle changes ortaking medicine may help with symptoms. Follow-up care is a key part of your treatment and safety. Be sure to make and go to all appointments, and call your doctor if you are having problems. It's also a good idea to know your test results and keep alist of the medicines you take. How can you care for yourself at home?  If you decide to take medicine to treat hot flashes, take it exactly as prescribed. Call your doctor if you think you are having a problem with your medicine. You will get more details on the specific medicine your doctor prescribes.  Learn to meditate. Sit quietly and focus on your breathing. Try to practice each day. Books, classes, and tapes can help you start a program.   Wear natural fabrics, such as cotton and silk. Dress in layers so you can take off clothes as needed.  Keep the room temperature cool, or use a fan. You are more likely to have a hot flash when you are too warm than when you are cool.  Use fewer blankets when you sleep at night.  Drink cold fluids rather than hot ones.  Limit food and drinks that make your symptoms worse. This may include things like caffeine, alcohol, or spicy foods.  Do not smoke. Smoking can make hot flashes worse. If you need help quitting, talk to your doctor about stop-smoking programs and medicines. These can increase your chances of quitting for good.    Get at least 30 minutes of exercise on most days of the week. Walking is a good choice. You also may want to do other activities, such as running, swimming, cycling, or playing tennis or team sports. Where can you learn more? Go to https://M[employer]. org and sign in to your [employer] account. Enter F700 in the [employer] box to learn more about \"Hot Flashes During Menopause: Care Instructions. \"     If you do not have an account, please click on the \"Sign Up Now\" link. Current as of: November 22, 2021               Content Version: 13.2  © 2831-5265 [employer]wise[employer]. Care instructions adapted under license by [facility] ([facility]). If you have questions about a medical condition or this instruction, always ask your healthcare professional. [address] any warranty or liability for your use of this information.

## 2022-05-06 LAB
HPV SAMPLE: NORMAL
HPV, GENOTYPE 16: NOT DETECTED
HPV, GENOTYPE 18: NOT DETECTED
HPV, HIGH RISK OTHER: NOT DETECTED
HPV, INTERPRETATION: NORMAL
SPECIMEN DESCRIPTION: NORMAL

## 2022-05-10 LAB — CYTOLOGY REPORT: NORMAL

## 2025-05-17 ENCOUNTER — APPOINTMENT (OUTPATIENT)
Dept: GENERAL RADIOLOGY | Age: 50
End: 2025-05-17

## 2025-05-17 ENCOUNTER — HOSPITAL ENCOUNTER (EMERGENCY)
Age: 50
Discharge: HOME | End: 2025-05-17
Attending: EMERGENCY MEDICINE

## 2025-05-17 VITALS
OXYGEN SATURATION: 98 % | WEIGHT: 179 LBS | RESPIRATION RATE: 14 BRPM | TEMPERATURE: 97.5 F | DIASTOLIC BLOOD PRESSURE: 94 MMHG | HEIGHT: 66 IN | HEART RATE: 55 BPM | BODY MASS INDEX: 28.77 KG/M2 | SYSTOLIC BLOOD PRESSURE: 110 MMHG
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
## 2025-05-17 PROCEDURE — 90471 IMMUNIZATION ADMIN: CPT | Performed by: EMERGENCY MEDICINE

## 2025-05-17 PROCEDURE — 90375 RABIES IG IM/SC: CPT | Performed by: EMERGENCY MEDICINE

## 2025-05-17 PROCEDURE — 73130 X-RAY EXAM OF HAND: CPT

## 2025-05-17 PROCEDURE — 99284 EMERGENCY DEPT VISIT MOD MDM: CPT

## 2025-05-17 PROCEDURE — 96372 THER/PROPH/DIAG INJ SC/IM: CPT

## 2025-05-17 PROCEDURE — 90675 RABIES VACCINE IM: CPT | Performed by: EMERGENCY MEDICINE

## 2025-05-17 PROCEDURE — 6360000002 HC RX W HCPCS: Performed by: EMERGENCY MEDICINE

## 2025-05-17 PROCEDURE — 90715 TDAP VACCINE 7 YRS/> IM: CPT | Performed by: EMERGENCY MEDICINE

## 2025-05-17 PROCEDURE — 90472 IMMUNIZATION ADMIN EACH ADD: CPT | Performed by: EMERGENCY MEDICINE

## 2025-05-17 RX ADMIN — RABIES VACCINE 1 ML: KIT at 15:53

## 2025-05-17 RX ADMIN — TETANUS TOXOID, REDUCED DIPHTHERIA TOXOID AND ACELLULAR PERTUSSIS VACCINE, ADSORBED 0.5 ML: 5; 2.5; 8; 8; 2.5 SUSPENSION INTRAMUSCULAR at 15:12

## 2025-05-17 RX ADMIN — RABIES IMMUNE GLOBULIN (HUMAN) 1620 UNITS: 300 INJECTION, SOLUTION INFILTRATION; INTRAMUSCULAR at 17:03

## 2025-05-17 ASSESSMENT — LIFESTYLE VARIABLES
HOW OFTEN DO YOU HAVE A DRINK CONTAINING ALCOHOL: NEVER
HOW MANY STANDARD DRINKS CONTAINING ALCOHOL DO YOU HAVE ON A TYPICAL DAY: PATIENT DOES NOT DRINK

## 2025-05-17 ASSESSMENT — PAIN SCALES - GENERAL: PAINLEVEL_OUTOF10: 5

## 2025-05-17 ASSESSMENT — PAIN - FUNCTIONAL ASSESSMENT: PAIN_FUNCTIONAL_ASSESSMENT: 0-10

## 2025-05-17 NOTE — ED NOTES
Here today with dog bite to left hand while walking her dog, that happened at 1230. The other dog walked up to her when in street. Patient's dog was leashed. Her dog did not get bit. Dog that bit is not familiar to pt.    Left hand with index finger and thumb tingling. Burning sensation to thumb, index, and middle fingers. Bite marks to  left index and middle finger on palmar surface.

## 2025-05-17 NOTE — ED PROVIDER NOTES
[facility]  Emergency Medicine Department    Pt Name: Chapis Wall  MRN: [id number]  Birthdate 1975  Date of evaluation: 5/17/2025  Provider: Maykel Chauhan MD    CHIEF COMPLAINT     Chief Complaint   Patient presents with    Animal Bite     Pt was trying to prevent her dog from being attacked and bit by another dog       HISTORY OF PRESENT ILLNESS  (Location/Symptom, Timing/Onset, Context/Setting,Quality, Duration, Modifying Factors, Severity.)   Chapis Wall is a 50 y.o. female who presents to the emergency department with a dog bite injury. While walking her own dog, she encountered a neighborhood dog that attempted to snap at her dog. In an effort to intervene, she put her hand down, and the other dog bit her hand. The injury affects two of her fingers and her hand, with broken skin. The patient is unsure about the collar status or vaccination history of the dog that bit her. She reports that her last tetanus shot was 'a long time ago'. The exact time of the incident is not provided. The patient is right-handed and denies any allergies to antibiotics.    Nursing Notes were reviewed.    ALLERGIES     Norco [hydrocodone-acetaminophen] and Penicillins    CURRENT MEDICATIONS       Discharge Medication List as of 5/17/2025  4:51 PM        CONTINUE these medications which have NOT CHANGED    Details   cyclobenzaprine (FLEXERIL) 5 MG tablet PLEASE SEE ATTACHED FOR DETAILED DIRECTIONSHistorical Med      doxycycline hyclate (VIBRAMYCIN) 100 MG capsule Take 1 capsule twice a day by oral route for 10 days.Historical Med      gabapentin (NEURONTIN) 300 MG capsule Take 1 capsule by mouth 3 times daily.Historical Med      meloxicam (MOBIC) 15 MG tablet Take 1 tablet by mouth dailyHistorical Med      QUEtiapine (SEROQUEL XR) 200 MG extended release tablet Historical Med      tiZANidine (ZANAFLEX) 4 MG tablet tizanidine 4 mg tablet   take 1 tablet by mouth twice a day if needed for 7 daysHistorical